# Patient Record
Sex: FEMALE | Race: WHITE
[De-identification: names, ages, dates, MRNs, and addresses within clinical notes are randomized per-mention and may not be internally consistent; named-entity substitution may affect disease eponyms.]

---

## 2017-08-01 ENCOUNTER — HOSPITAL ENCOUNTER (EMERGENCY)
Dept: HOSPITAL 10 - FTE | Age: 52
LOS: 1 days | Discharge: HOME | End: 2017-08-02
Payer: COMMERCIAL

## 2017-08-01 VITALS
WEIGHT: 128.97 LBS | HEIGHT: 61 IN | BODY MASS INDEX: 24.35 KG/M2 | BODY MASS INDEX: 24.35 KG/M2 | WEIGHT: 128.97 LBS | HEIGHT: 61 IN

## 2017-08-01 DIAGNOSIS — R10.2: ICD-10-CM

## 2017-08-01 DIAGNOSIS — N93.8: Primary | ICD-10-CM

## 2017-08-01 PROCEDURE — 85025 COMPLETE CBC W/AUTO DIFF WBC: CPT

## 2017-08-01 PROCEDURE — 36415 COLL VENOUS BLD VENIPUNCTURE: CPT

## 2017-08-01 PROCEDURE — 84702 CHORIONIC GONADOTROPIN TEST: CPT

## 2017-08-01 PROCEDURE — 76856 US EXAM PELVIC COMPLETE: CPT

## 2017-08-01 PROCEDURE — 81001 URINALYSIS AUTO W/SCOPE: CPT

## 2017-08-02 LAB
ADD UMIC: YES
BASOPHILS # BLD AUTO: 0.1 10^3/UL (ref 0–0.1)
BASOPHILS NFR BLD: 0.6 % (ref 0–2)
COLOR UR: YELLOW
EOSINOPHIL # BLD: 0.2 10^3/UL (ref 0–0.5)
EOSINOPHIL NFR BLD: 2.3 % (ref 0–7)
ERYTHROCYTE [DISTWIDTH] IN BLOOD BY AUTOMATED COUNT: 19.8 % (ref 11.5–14.5)
GLUCOSE UR STRIP-MCNC: NEGATIVE MG/DL
HCT VFR BLD CALC: 35.4 % (ref 37–47)
HGB BLD-MCNC: 10.7 G/DL (ref 12–16)
KETONES UR STRIP.AUTO-MCNC: NEGATIVE MG/DL
LYMPHOCYTES # BLD AUTO: 2.9 10^3/UL (ref 0.8–2.9)
LYMPHOCYTES NFR BLD AUTO: 33.6 % (ref 15–51)
MCH RBC QN AUTO: 22.7 PG (ref 29–33)
MCHC RBC AUTO-ENTMCNC: 30.2 G/DL (ref 32–37)
MCV RBC AUTO: 75 FL (ref 82–101)
MONOCYTES # BLD: 0.9 10^3/UL (ref 0.3–0.9)
MONOCYTES NFR BLD: 10.6 % (ref 0–11)
NEUTROPHILS # BLD: 4.5 10^3/UL (ref 1.6–7.5)
NEUTROPHILS NFR BLD AUTO: 52.7 % (ref 39–77)
NITRITE UR QL STRIP.AUTO: NEGATIVE MG/DL
NRBC # BLD MANUAL: 0 10^3/UL (ref 0–0)
NRBC BLD AUTO-RTO: 0 /100WBC (ref 0–0)
PLATELET # BLD: 386 10^3/UL (ref 140–415)
PMV BLD AUTO: 10.1 FL (ref 7.4–10.4)
RBC # BLD AUTO: 4.72 10^6/UL (ref 4.2–5.4)
RBC # UR AUTO: (no result) MG/DL
SQUAMOUS #/AREA URNS HPF: (no result) /HPF
UR ASCORBIC ACID: NEGATIVE MG/DL
UR BILIRUBIN (DIP): NEGATIVE MG/DL
UR CLARITY: CLEAR
UR PH (DIP): 6 (ref 5–9)
UR RBC: 83 /HPF (ref 0–5)
UR SPECIFIC GRAVITY (DIP): 1.02 (ref 1–1.03)
UR TOTAL PROTEIN (DIP): NEGATIVE MG/DL
UROBILINOGEN UR STRIP-ACNC: NEGATIVE MG/DL
WBC # BLD AUTO: 8.5 10^3/UL (ref 4.8–10.8)
WBC # UR STRIP: NEGATIVE LEU/UL

## 2017-08-02 NOTE — ERD
ER Documentation


Chief Complaint


Date/Time


DATE: 8/2/17 


TIME: 00:16


Chief Complaint


NO PERIOD X 2 MONTHS, NOW WITH HEAVY VAG BLEED X 2 WEEKS





HPI


52-year-old female presents to emergency department for complaints of heavy 

vaginal bleeding for the last 2 weeks, soaks 2 pads per day. Patient has not 

had any menstruation for 2 months prior to this. Patient had a bilateral tubal 

ligation, and states that she is not pregnant. Patient's complaining of pelvic 

pain cramping pain 4/10 scale, not better or worse with anything. Patient 

denies any hematuria or dysuria. Patient denies any fever or chills.





ROS


All systems reviewed and are negative except as per history of present illness.





Medications


Home Meds


Reported Medications


[none] Unknown Strength  No Conflict Check


   8/2/17





Allergies


Allergies:  


Coded Allergies:  


     No Known Allergy (Unverified , 8/2/17)





PMhx/Soc


Medical and Surgical Hx:  pt denies Medical Hx, pt denies Surgical Hx


Hx Alcohol Use:  No


Hx Substance Use:  No


Hx Tobacco Use:  No


Smoking Status:  Never smoker





FmHx


Family History:  No coronary disease, No diabetes, No other





Physical Exam


Vitals





Vital Signs








  Date Time  Temp Pulse Resp B/P Pulse Ox O2 Delivery O2 Flow Rate FiO2


 


8/1/17 23:08 98.5 69 18 140/65 99   








Physical Exam


GENERAL:  The patient is well developed and appropriate for usual state of 

health, in no apparent distress.


CHEST:  Clear to auscultation bilaterally. There are no rales, wheezes or 

rhonchi. 


HEART:  Regular rate and rhythm. No murmurs, clicks, rubs or gallops. No S3 or 

S4.


ABDOMEN:  Soft, nontender and nondistended. Good bowel sounds. No rebound or 

guarding. No gross peritonitis. No gross organomegaly or masses. No Guerra sign 

or McBurney point tenderness.


BACK:  No midline or flank tenderness.


EXTREMITIES:  Equal pulses bilaterally. There is no peripheral clubbing, 

cyanosis or edema. No focal swelling or erythema. Full range of motion. Grossly 

neurovascularly intact.


NEURO:  Alert and oriented. Cranial nerves 2-12 intact. Motor strength in all 4 

extremities with 5/5 strength.  Sensation grossly intact. Normal speech and 

gait. 


SKIN:  There is no apparent rash or petechia. The skin is warm and dry.


HEMATOLOGIC AND LYMPHATIC:  There is no evidence of excessive bruising or 

lymphedema. No gross cervical, axillary, or inguinal lymphadenopathy.


Result Diagram:  


8/2/17 0025





Results 24 hrs








 Laboratory Tests








Test


  8/2/17


00:25


 


White Blood Count 8.510^3/ul 


 


Red Blood Count 4.7210^6/ul 


 


Hemoglobin 10.7g/dl 


 


Hematocrit 35.4% 


 


Mean Corpuscular Volume 75.0fl 


 


Mean Corpuscular Hemoglobin 22.7pg 


 


Mean Corpuscular Hemoglobin


Concent 30.2g/dl 


 


 


Red Cell Distribution Width 19.8% 


 


Platelet Count 70286^3/UL 


 


Mean Platelet Volume 10.1fl 


 


Neutrophils % 52.7% 


 


Lymphocytes % 33.6% 


 


Monocytes % 10.6% 


 


Eosinophils % 2.3% 


 


Basophils % 0.6% 


 


Nucleated Red Blood Cells % 0.0/100WBC 


 


Neutrophils # 4.510^3/ul 


 


Lymphocytes # 2.910^3/ul 


 


Monocytes # 0.910^3/ul 


 


Eosinophils # 0.210^3/ul 


 


Basophils # 0.110^3/ul 


 


Nucleated Red Blood Cells # 0.010^3/ul 


 


Urine Color YELLOW 


 


Urine Clarity CLEAR 


 


Urine pH 6.0 


 


Urine Specific Gravity 1.018 


 


Urine Ketones NEGATIVEmg/dL 


 


Urine Nitrite NEGATIVEmg/dL 


 


Urine Bilirubin NEGATIVEmg/dL 


 


Urine Urobilinogen NEGATIVEmg/dL 


 


Urine Leukocyte Esterase NEGATIVELeu/ul 


 


Urine Microscopic RBC 83/HPF 


 


Urine Microscopic WBC 0/HPF 


 


Urine Squamous Epithelial


Cells FEW/HPF 


 


 


Urine Hemoglobin 3+mg/dL 


 


Urine Glucose NEGATIVEmg/dL 


 


Urine Total Protein NEGATIVEmg/dl 


 


Beta HCG, Quantitative < 2.4mIU/ml 











PROCEDURE:   US Pelvis. 


 


CLINICAL INDICATION:   Vaginal bleeding.  Last menstrual period 07/20/2017 


 


TECHNIQUE:   Multiple sonographic images of the pelvis were obtained utilizing 

a transabdominal technique.   The images were reviewed on a PACS workstation. 


 


COMPARISON:   None. 


 


FINDINGS:


The uterus measures 9.5 x 3.6 x 5.3 cm and is unremarkable and appears to be 

retroflexed. The thickness of the endometrium equals 5.2 mm.  The right ovary 

measures 3.3 x 2.6 x 2.5 cm and contains a 2 cm follicle.  The left ovary 

measures 2.3 x 1.1 x 1.8 cm and is unremarkable.  Color flow and spectral 

analysis demonstrates normal arterial flow in both ovaries.  No adnexal mass or 

free intrapelvic fluid is seen.


 


IMPRESSION:


No abnormality seen. Consistent with 2 cm right ovarian follicle.  Please see 

above.


 


RPTAT: HJES


_____________________________________________ 


.Sebas Macias MD, MD           Date    Time 


Electronically viewed and signed by .Sebas Macias MD, MD on 08/02/2017 01:31 


 


D:  08/02/2017 01:31  T:  08/02/2017 01:31


.S/





CC: DUSTY CEDILLO NP





Procedures/MDM


Medical Decision Making: Patients vaginal bleeding is most likely consistent 

of dysfunctional uterine bleeding. Patient does not show any evidence of 

hypovolemic shock. Patients hemoglobin and hematocrit is stable. There is low 

suspicion for ectopic pregnancy. ALICIA results show no uterine fibroids, no 

pregnancy no other abnormality BetaHCG Quantitative is appropriate for 

pregnancy.There is no signs of symptoms of dehydration. There is low suspicion 

for sepsis. Patient appears well and is hemodynamically stable. 





Disposition: Home. Condition: Stable


Rx: Ferrous sulfate


Instructions: Patient is advised to do bed rest, avoid heavy lifting, and avoid 

having sex until cleared by OB doctor. Was advised to see OB doctor for further 

evaluation. Patient is advised that is symptoms are worst, severe bleeding, 

dizziness, severe abdominal pain, fever, worst signs and symptoms to return to 

the emergency department immediately.





Departure


Diagnosis:  


 Primary Impression:  


 Vaginal bleeding


Condition:  Stable


Patient Instructions:  Dysfunctional Uterine Bleeding





Additional Instructions:  


Patient is advised to do bed rest, avoid heavy lifting, and avoid having sex 

until cleared by OB doctor. Was advised to see OB doctor for further 

evaluation. Patient is advised that is symptoms are worst, severe bleeding, 

dizziness, severe abdominal pain, fever, worst signs and symptoms to return to 

the emergency department immediately.











DUSTY CEDILLO NP Aug 2, 2017 00:18

## 2017-08-02 NOTE — RADRPT
PROCEDURE:   US Pelvis. 

 

CLINICAL INDICATION:   Vaginal bleeding.  Last menstrual period 07/20/2017 

 

TECHNIQUE:   Multiple sonographic images of the pelvis were obtained utilizing a transabdominal tech
nique.   The images were reviewed on a PACS workstation. 

 

COMPARISON:   None. 

 

FINDINGS:

The uterus measures 9.5 x 3.6 x 5.3 cm and is unremarkable and appears to be retroflexed. The thickn
ess of the endometrium equals 5.2 mm.  The right ovary measures 3.3 x 2.6 x 2.5 cm and contains a 2 
cm follicle.  The left ovary measures 2.3 x 1.1 x 1.8 cm and is unremarkable.  Color flow and spectr
al analysis demonstrates normal arterial flow in both ovaries.  No adnexal mass or free intrapelvic 
fluid is seen.

 

IMPRESSION:

No abnormality seen. Consistent with 2 cm right ovarian follicle.  Please see above.

 

RPTAT: HJES

_____________________________________________ 

.Sebas Macias MD, MD           Date    Time 

Electronically viewed and signed by .Sebas Macias MD, MD on 08/02/2017 01:31 

 

D:  08/02/2017 01:31  T:  08/02/2017 01:31

.S/

## 2017-08-06 ENCOUNTER — HOSPITAL ENCOUNTER (EMERGENCY)
Dept: HOSPITAL 10 - FTE | Age: 52
Discharge: HOME | End: 2017-08-06
Payer: COMMERCIAL

## 2017-08-06 VITALS — WEIGHT: 127.87 LBS | HEIGHT: 55 IN | BODY MASS INDEX: 29.59 KG/M2

## 2017-08-06 DIAGNOSIS — R10.2: ICD-10-CM

## 2017-08-06 DIAGNOSIS — N93.8: Primary | ICD-10-CM

## 2017-08-06 LAB
BASOPHILS # BLD AUTO: 0 10^3/UL (ref 0–0.1)
BASOPHILS NFR BLD: 0.6 % (ref 0–2)
EOSINOPHIL # BLD: 0.2 10^3/UL (ref 0–0.5)
EOSINOPHIL NFR BLD: 3.2 % (ref 0–7)
ERYTHROCYTE [DISTWIDTH] IN BLOOD BY AUTOMATED COUNT: 20.3 % (ref 11.5–14.5)
HCT VFR BLD CALC: 30.6 % (ref 37–47)
HGB BLD-MCNC: 9.5 G/DL (ref 12–16)
LYMPHOCYTES # BLD AUTO: 2.2 10^3/UL (ref 0.8–2.9)
LYMPHOCYTES NFR BLD AUTO: 31.7 % (ref 15–51)
MCH RBC QN AUTO: 23.8 PG (ref 29–33)
MCHC RBC AUTO-ENTMCNC: 31 G/DL (ref 32–37)
MCV RBC AUTO: 76.7 FL (ref 82–101)
MONOCYTES # BLD: 0.6 10^3/UL (ref 0.3–0.9)
MONOCYTES NFR BLD: 7.9 % (ref 0–11)
NEUTROPHILS # BLD: 3.9 10^3/UL (ref 1.6–7.5)
NEUTROPHILS NFR BLD AUTO: 56.2 % (ref 39–77)
NRBC # BLD MANUAL: 0 10^3/UL (ref 0–0)
NRBC BLD AUTO-RTO: 0 /100WBC (ref 0–0)
PLATELET # BLD: 381 10^3/UL (ref 140–415)
PMV BLD AUTO: 10.2 FL (ref 7.4–10.4)
RBC # BLD AUTO: 3.99 10^6/UL (ref 4.2–5.4)
URINE BLOOD (DIP) POC: (no result)
WBC # BLD AUTO: 6.9 10^3/UL (ref 4.8–10.8)

## 2017-08-06 PROCEDURE — 36415 COLL VENOUS BLD VENIPUNCTURE: CPT

## 2017-08-06 PROCEDURE — 85025 COMPLETE CBC W/AUTO DIFF WBC: CPT

## 2017-08-06 PROCEDURE — 76856 US EXAM PELVIC COMPLETE: CPT

## 2017-08-06 PROCEDURE — 81003 URINALYSIS AUTO W/O SCOPE: CPT

## 2017-08-06 NOTE — RADRPT
PROCEDURE:   US Pelvis. 

 

CLINICAL INDICATION:   pelvic pain, vaginal bleeding x 2 weeks 

 

TECHNIQUE:   Multiple sonographic images of the pelvis were obtained utilizing a transabdominal and 
endovaginal technique.   The images were reviewed on a PACS workstation. 

 

COMPARISON:   08/02/2017 

 

FINDINGS:

The uterus is visualized and measures 4.3 x 6.0 x 10.1 cm. The endometrial echo complex is normal an
d measures 5 mm .

 

 

The right ovary measures  1.9 x 2.1 x 2.3 cm  The left ovary measures 1.8 x 0.9 x 2.0 cm.  The previ
ously noted right ovarian cyst is not well visualized.  There is normal vascular flow to both ovarie
s.  No adnexal masses or free fluid is identified.

 

 

IMPRESSION:

No definite abnormalities are identified.  The previously noted right ovarian cyst is not visualized
.

 

 

RPTAT:AAJJ

_____________________________________________ 

Physician Fidencio           Date    Time 

Electronically viewed and signed by Physician Fidencio on 08/06/2017 18:26 

 

D:  08/06/2017 18:26  T:  08/06/2017 18:26

/

## 2017-08-06 NOTE — ERD
ER Documentation


Chief Complaint


Date/Time


DATE: 17 


TIME: 18:06


Chief Complaint


VAG BLEEDING X2 WEEKS, MILD ABD PAIN





HPI


This is a 52-year-old female presenting to emergency department with vaginal 

bleeding 2 weeks.  Patient states she has intermittent light and heavy vaginal 

bleeding over the past 2 weeks.  Patient noticed more blood clots lately.  No 

dysuria or hematuria.  No urinary frequency or urgency.  Patient was here 5 

days ago with same symptoms and full workup was done at that time including 

pelvic ultrasound and lab work.  At that time patient was sent home and was 

told everything was normal.  She states she did not follow-up with her primary 

care provider.  Patient's last menstrual period was 2 months ago and states her 

periods have been more irregular over the past few months.





ROS


All systems reviewed and are negative except as per history of present illness.





Medications


Home Meds


Active Scripts


Ferrous Sulfate* (Ferrous Sulfate*) 325 Mg Tabec, 325 MG PO BID, #60 TAB


   Prov:DUSTY CEDILLO NP         17


Reported Medications


[none] Unknown Strength  No Conflict Check


   17





Allergies


Allergies:  


Coded Allergies:  


     No Known Allergy (Unverified , 17)





PMhx/Soc


Medical and Surgical Hx:  pt denies Medical Hx, pt denies Surgical Hx


Hx Alcohol Use:  No


Hx Substance Use:  No


Hx Tobacco Use:  No


Smoking Status:  Never smoker





Physical Exam


Vitals





Vital Signs








  Date Time  Temp Pulse Resp B/P Pulse Ox O2 Delivery O2 Flow Rate FiO2


 


17 16:46 99.1 78 18 123/56 98   








Physical Exam


Const:               No acute distress, alert


Head:   Atraumatic 


Eyes:    Normal Conjunctiva


ENT:    Normal External Ears, Nose and Mouth.


Neck:               Full range of motion..~ No meningismus.


Resp:    Clear to auscultation bilaterally.  No wheezing, rhonchi or crackles.


Cardio:    Regular rate and rhythm, no murmurs


Abd:    Soft, non tender, non distended. Normal bowel sounds


Skin:    No petechiae or rashes


Back:    No midline or flank tenderness


Ext:    No cyanosis, or edema


Neur:    Awake and alert


Psych:    Normal Mood and Affect


Result Diagram:  


17 2816





Results 24 hrs





 Laboratory Tests








Test


  17


17:15 17


17:25


 


White Blood Count 6.910^3/ul  


 


Red Blood Count 3.9910^6/ul  


 


Hemoglobin 9.5g/dl  


 


Hematocrit 30.6%  


 


Mean Corpuscular Volume 76.7fl  


 


Mean Corpuscular Hemoglobin 23.8pg  


 


Mean Corpuscular Hemoglobin


Concent 31.0g/dl 


  


 


 


Red Cell Distribution Width 20.3%  


 


Platelet Count 15515^3/UL  


 


Mean Platelet Volume 10.2fl  


 


Neutrophils % 56.2%  


 


Lymphocytes % 31.7%  


 


Monocytes % 7.9%  


 


Eosinophils % 3.2%  


 


Basophils % 0.6%  


 


Nucleated Red Blood Cells % 0.0/100WBC  


 


Neutrophils # 3.910^3/ul  


 


Lymphocytes # 2.210^3/ul  


 


Monocytes # 0.610^3/ul  


 


Eosinophils # 0.210^3/ul  


 


Basophils # 0.010^3/ul  


 


Nucleated Red Blood Cells # 0.010^3/ul  


 


Bedside Urine pH (LAB)  6.5 


 


Bedside Urine Protein (LAB)  1+ 


 


Bedside Urine Glucose (UA)  Negative 


 


Bedside Urine Ketones (LAB)  Negative 


 


Bedside Urine Blood  3+ 


 


Bedside Urine Nitrite (LAB)  Negative 


 


Bedside Urine Leukocyte


Esterase (L 


  Negative 


 











Procedures/MDM


[place] [id number]


 Radiology Main Line: [phone]





 DIAGNOSTIC IMAGING REPORT





 Patient: ABI BLAIR   : 1965   Age: 52  Sex: F                  

      


 MR #:    [id number]   [id number]    DOS: 17 1658


 Ordering MD: KIESHA PRATT NP   Location:  [facility]   Room/Bed:                    

                        


 








PROCEDURE:   US Pelvis. 


 


CLINICAL INDICATION:   pelvic pain, vaginal bleeding x 2 weeks 


 


TECHNIQUE:   Multiple sonographic images of the pelvis were obtained utilizing 

a transabdominal and endovaginal technique.   The images were reviewed on a 

PACS workstation. 


 


COMPARISON:   2017 


 


FINDINGS:


The uterus is visualized and measures 4.3 x 6.0 x 10.1 cm. The endometrial echo 

complex is normal and measures 5 mm .


 


 


The right ovary measures  1.9 x 2.1 x 2.3 cm  The left ovary measures 1.8 x 0.9 

x 2.0 cm.  The previously noted right ovarian cyst is not well visualized.  

There is normal vascular flow to both ovaries.  No adnexal masses or free fluid 

is identified.


 


 


IMPRESSION:


No definite abnormalities are identified.  The previously noted right ovarian 

cyst is not visualized.


 





MDM: This is a 52-year-old female presents emergency department with irregular 

vaginal bleeding 2 weeks.  Patient denies abdominal or pelvic pain.  Patient 

has had intermittent light and heavy vaginal bleeding over the last 2 weeks.  

Patient was recently here 5 days ago with same symptoms.  Hemoglobin is down 

trending to 9.5 however is not critical value at this time.  Urine shows 3+ 

blood, 1+ protein and is negative for infection.  Urine pregnancy is negative.  

Pelvic ultrasound reviewed by radiologist as no definite abnormalities are 

identified.  The previously noted right ovarian cyst is not visualized.


Vital signs are stable.  Patient is afebrile upon arrival to ED.





Low suspicion for ectopic pregnancy, pregnancy, anemia or hemorrhage.  Patient'

s diagnosis is vaginal bleeding.





Patient is appropriate for outpatient management instructed to follow-up with 

gynecologist in the next 2-3 days for reassessment.  Resources provided.  

Return to ED for any high fever, chest pain, difficulty breathing, shortness 

breath, wheezing, vomiting, diarrhea, abdominal pain or any new or worsening 

symptoms. Patient verbalizes understanding. All questions answered at 

discharge.  Norwegian translation used during this encounter.





Departure


Diagnosis:  


 Primary Impression:  


 Vaginal bleeding


Condition:  Stable











KIESHA PRATT NP Aug 6, 2017 18:10

## 2018-02-12 ENCOUNTER — HOSPITAL ENCOUNTER (EMERGENCY)
Dept: HOSPITAL 91 - FTE | Age: 53
Discharge: HOME | End: 2018-02-12
Payer: MEDICAID

## 2018-02-12 ENCOUNTER — HOSPITAL ENCOUNTER (EMERGENCY)
Age: 53
Discharge: HOME | End: 2018-02-12

## 2018-02-12 DIAGNOSIS — R05: Primary | ICD-10-CM

## 2018-02-12 PROCEDURE — 71045 X-RAY EXAM CHEST 1 VIEW: CPT

## 2018-02-12 PROCEDURE — 99284 EMERGENCY DEPT VISIT MOD MDM: CPT

## 2018-02-18 ENCOUNTER — HOSPITAL ENCOUNTER (EMERGENCY)
Age: 53
Discharge: HOME | End: 2018-02-18

## 2018-02-18 ENCOUNTER — HOSPITAL ENCOUNTER (EMERGENCY)
Dept: HOSPITAL 91 - FTE | Age: 53
Discharge: HOME | End: 2018-02-18
Payer: MEDICAID

## 2018-02-18 DIAGNOSIS — R05: Primary | ICD-10-CM

## 2018-02-18 PROCEDURE — 71045 X-RAY EXAM CHEST 1 VIEW: CPT

## 2018-02-18 PROCEDURE — 99284 EMERGENCY DEPT VISIT MOD MDM: CPT
